# Patient Record
(demographics unavailable — no encounter records)

---

## 2025-06-27 NOTE — ASSESSMENT
[FreeTextEntry1] : 71-year-old RH male patient who presents [facility] as a new patient for initial evaluation. Patient was referred by Dr Farnsworth to our clinic for concerns of Parkinson's Disease.  ANSELMO scan (6/5/25) - mildly dec uptake in left caudate nucleus, minimally decrease update in the right putamen, markedly decreased update in the left putamen. Confirmed with PD.  Overall, Pt is stable and physically active. Discuss phenomenon and evolution of PD, at this time mild PD with no evidence of significant evolution: slight diminish of trunk movement with walking. Acknowledged and encourage walking and physically active. Discussed medication use, MRI brain, diet in PD.    Plan  - MRI Brain to establish baseline and complete work-up to rule out vascular disease that may contribute to the underlying neurological picture. - Start Rasagiline 0.5mg QAM [id number], then 1mg QAM. - Continue PT - Discussed Mediterranean diet - Encouraged to increase exercise and physical activity and maintain an active social and intellectual life - Follow up  in 1 months - Follow up with KRISSY Castillo in 3 months    More than 50% of the visit were dedicated to review of treatment, therapeutic plan, and prognosis, and patient was counseled on coping and physical and emotional wellbeing.

## 2025-06-27 NOTE — HISTORY OF PRESENT ILLNESS
[FreeTextEntry1] :  71-year-old RH male patient who presents to [facility] as a new patient for initial evaluation. Patient was referred by Dr Farnsworth to our clinic for concerns of Parkinson's Disease.   Wife notices 1year ago, gait shuffling and stooped posture at least a year ago. The patient noticed stiffness, dull pain to right hand since 2/2025, went to ortho specialist s/p x-ray of hand, mild arthritis of right hand. at the same time, notice difficulty writing and buttoning shirt.  In 4/2025, annual physical, report to PCP not able to use hand as used to. Saw Dr Land s/p EEG (-), carotid doppler (16-49% stenosis in the right ICA and 16-49% in the left ICA), blood test normal ANSELMO scan (6/5/25) - mildly dec uptake in left caudate nucleus, minimally decrease update in the right putamen, markedly decreased update in the left putamen. Start PT on hand, pt feels improved. Gait improved Pt reports no appearing changes in movement as comparing in 1 month ago.  Medical History - Right hand arthritis - Left Knee arthritis - Mincus tear on b/l knee - SCC of scalp     Surgical History - B/L knee arthroscopies (15years ago) - SCC excision of scalp (2012) - Tonsillectomy    Social History Ethnicity: Buddhism [place] Occupation:  retired, real estate business part-term Marital status: ,4 healthy children   Smoking: never ETHO use: glass of wine socially Substance use: never Toxin exposure: never   Family History Mother dx with dementia at age 87-88, passed away at age 88    Current Medications: Vitamins    Motor Function:   Ambulation/AD/Gait/Balance: balance ok.  shuffling gait and posture slight improved FOG: denies Falls: denies Tremor: denies Dyskinesia: denies  Hand Dexterity: hard to hold on heavy object with right hand; difficulty to write   Non-Motor Function:   ADLS: able to do ADLS, takes longer and more effort with buttoning, cutting food On Time: NA Off Time: NA Phonation: clear Sialorrhea: denies Dysphagia to S/L: denies Presyncope related to orthostasis BP today: denies BM: daily Urinary: nocturia, f/u with urologist Sleep is good, wake up 1-3x for nocturia, no issue falling back to sleep. Denies vivid dream.  RBD: denies Daytime Somnolence take short nap about 20min, wife notice fatigue late afternoon Mood: upbeat and position, concern and slight stress with diagnosis and health for the past month Hallucinations: denies Cognition: denies Recent Vision changes: denies Loss of sense of smell: denies   PT/OT/SLT PT hand 1/weel Exercise: walking/jogging 45min-1hr daily walk 35 in the after daily Pilates 4x a week Free weight 3x a week

## 2025-06-27 NOTE — HISTORY OF PRESENT ILLNESS
[FreeTextEntry1] :  71-year-old RH male patient who presents to [facility] as a new patient for initial evaluation. Patient was referred by Dr Farnsworth to our clinic for concerns of Parkinson's Disease.   Wife notices 1year ago, gait shuffling and stooped posture at least a year ago. The patient noticed stiffness, dull pain to right hand since 2/2025, went to ortho specialist s/p x-ray of hand, mild arthritis of right hand. at the same time, notice difficulty writing and buttoning shirt.  In 4/2025, annual physical, report to PCP not able to use hand as used to. Saw Dr Land s/p EEG (-), carotid doppler (16-49% stenosis in the right ICA and 16-49% in the left ICA), blood test normal ANSELMO scan (6/5/25) - mildly dec uptake in left caudate nucleus, minimally decrease update in the right putamen, markedly decreased update in the left putamen. Start PT on hand, pt feels improved. Gait improved Pt reports no appearing changes in movement as comparing in 1 month ago.  Medical History - Right hand arthritis - Left Knee arthritis - Mincus tear on b/l knee - SCC of scalp     Surgical History - B/L knee arthroscopies (15years ago) - SCC excision of scalp (2012) - Tonsillectomy    Social History Ethnicity: Gnosticism [place] Occupation:  retired, real estate business part-term Marital status: ,4 healthy children   Smoking: never ETHO use: glass of wine socially Substance use: never Toxin exposure: never   Family History Mother dx with dementia at age 87-88, passed away at age 88    Current Medications: Vitamins    Motor Function:   Ambulation/AD/Gait/Balance: balance ok.  shuffling gait and posture slight improved FOG: denies Falls: denies Tremor: denies Dyskinesia: denies  Hand Dexterity: hard to hold on heavy object with right hand; difficulty to write   Non-Motor Function:   ADLS: able to do ADLS, takes longer and more effort with buttoning, cutting food On Time: NA Off Time: NA Phonation: clear Sialorrhea: denies Dysphagia to S/L: denies Presyncope related to orthostasis BP today: denies BM: daily Urinary: nocturia, f/u with urologist Sleep is good, wake up 1-3x for nocturia, no issue falling back to sleep. Denies vivid dream.  RBD: denies Daytime Somnolence take short nap about 20min, wife notice fatigue late afternoon Mood: upbeat and position, concern and slight stress with diagnosis and health for the past month Hallucinations: denies Cognition: denies Recent Vision changes: denies Loss of sense of smell: denies   PT/OT/SLT PT hand 1/weel Exercise: walking/jogging 45min-1hr daily walk 35 in the after daily Pilates 4x a week Free weight 3x a week

## 2025-06-27 NOTE — HISTORY OF PRESENT ILLNESS
[FreeTextEntry1] :  71-year-old RH male patient who presents to [facility] as a new patient for initial evaluation. Patient was referred by Dr Farnsworth to our clinic for concerns of Parkinson's Disease.   Wife notices 1year ago, gait shuffling and stooped posture at least a year ago. The patient noticed stiffness, dull pain to right hand since 2/2025, went to ortho specialist s/p x-ray of hand, mild arthritis of right hand. at the same time, notice difficulty writing and buttoning shirt.  In 4/2025, annual physical, report to PCP not able to use hand as used to. Saw Dr Land s/p EEG (-), carotid doppler (16-49% stenosis in the right ICA and 16-49% in the left ICA), blood test normal ANSELMO scan (6/5/25) - mildly dec uptake in left caudate nucleus, minimally decrease update in the right putamen, markedly decreased update in the left putamen. Start PT on hand, pt feels improved. Gait improved Pt reports no appearing changes in movement as comparing in 1 month ago.  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  shuffling gait and posture slight improved FOG: denies Falls: denies Tremor: denies Dyskinesia: denies  Hand Dexterity: hard to hold on heavy object with right hand; difficulty to write   Non-Motor Function:   ADLS: able to do ADLS, takes longer and more effort with buttoning, cutting food On Time: NA Off Time: NA Phonation: clear Sialorrhea: denies Dysphagia to S/L: denies Presyncope related to orthostasis BP today: denies BM: daily Urinary: nocturia, f/u with urologist Sleep is good, wake up 1-3x for nocturia, no issue falling back to sleep. Denies vivid dream.  RBD: denies Daytime Somnolence take short nap about 20min, wife notice fatigue late afternoon Mood: upbeat and position, concern and slight stress with diagnosis and health for the past month Hallucinations: denies Cognition: denies Recent Vision changes: denies Loss of sense of smell: denies   PT/OT/SLT PT hand 1/weel Exercise: walking/jogging 45min-1hr daily walk 35 in the after daily Pilates 4x a week Free weight 3x a week

## 2025-06-27 NOTE — ASSESSMENT
[FreeTextEntry1] : 71-year-old RH male patient who presents [facility] as a new patient for initial evaluation. Patient was referred by Dr Farnsworth to our clinic for concerns of Parkinson's Disease.  ANSELMO scan (6/5/25) - mildly dec uptake in left caudate nucleus, minimally decrease update in the right putamen, markedly decreased update in the left putamen. Confirmed with PD.  Overall, Pt is stable and physically active. Discuss phenomenon and evolution of PD, at this time mild PD with no evidence of significant evolution: slight diminish of trunk movement with walking. Acknowledged and encourage walking and physically active. Discussed medication use, MRI brain, diet in PD.    Plan  - MRI Brain to establish baseline and complete work-up to rule out vascular disease that may contribute to the underlying neurological picture. - Start Rasagiline 0.5mg QAM [id number], then 1mg QAM. - Continue PT - Discussed Mediterranean diet - Encouraged to increase exercise and physical activity and maintain an active social and intellectual life - Follow up  in 1 months - Follow up with KRISSY Castillo in 3 months    More than 50% of the visit were dedicated to review of treatment, therapeutic plan, and prognosis, and patient was counseled on coping and physical and emotional wellbeing.

## 2025-06-27 NOTE — ASSESSMENT
[FreeTextEntry1] : 71-year-old RH male patient who presents [facility] as a new patient for initial evaluation. Patient was referred by Dr Farnsworth to our clinic for concerns of Parkinson's Disease.  ANSELMO scan (6/5/25) - mildly dec uptake in left caudate nucleus, minimally decrease update in the right putamen, markedly decreased update in the left putamen. Confirmed with PD.  Overall, Pt is stable and physically active. Discuss phenomenon and evolution of PD, at this time mild PD with no evidence of significant evolution: slight diminish of trunk movement with walking. Acknowledged and encourage walking and physically active. Discussed medication use, MRI brain, diet in PD.    Plan  - MRI Brain to establish baseline and complete work-up to rule out vascular disease that may contribute to the underlying neurological picture. - Start Rasagiline 0.5mg QAM [id number], then 1mg QAM. - Continue PT - Discussed Mediterranean diet - Encouraged to increase exercise and physical activity and maintain an active social and intellectual life - Follow up  in 1 months - Follow up with KRISSY Castillo in 3 months    More than 50% of the visit were dedicated to review of treatment, therapeutic plan, and prognosis, and patient was counseled on coping and physical and emotional wellbeing.

## 2025-06-27 NOTE — PHYSICAL EXAM
[FreeTextEntry1] : Neurologic:   Orientation: normal cognitive Memory: short term memory intact, remote memory intact and recent registration memory intact.   Attention: the attention span was normal, normal concentrating ability and visual attention was not decreased.  His speech is fluent without and dysphasia or dysarthria. Cranial Nerves: visual acuity intact bilaterally, visual fields full to confrontation, pupils equal round and reactive to light, extraocular motion intact, facial sensation intact symmetrically, face symmetrical, hearing was intact bilaterally, head turning and shoulder shrug symmetric and there was no tongue deviation with protrusion.   Sensory exam: light touch was intact and pain and temperature was intact.   Motor Strength: 5/5 Mild rigidity with some cogwheeling, present bilaterally, more evident on the right side. No appearing tremor noted Mild loss of motor dexterity, with decrement at rapid sequential and rapid alternating movements, also slightly more evident on the right side.  Finger to nose dysmetria was not present.   Foot tapping is slightly impaired, also with the right side more affected. Posture is mildly stooped, with some shortening of gait stride and mild asymmetry with left reduction, and decrease arm swing, more evident on the left side. Tends to lean on the right side. No trouble standing from a seated position with arms crossed. No trouble with turns, no freezing of gait. Postural reflexes are normal Deep tendon reflexes: Biceps right 1+. Biceps left 1+.  Triceps right 1+. Triceps left 1+.  Brachioradialis right 1+. Brachioradialis left 1+.  Patella right1+. Patella left 1+.

## 2025-07-17 NOTE — PHYSICAL EXAM
[FreeTextEntry1] : Patient sitting comfortably in the chair. Oriented to time, place, situation and self.  Language comprehension and production normal, fluent speech, mild aprosody.  Pupils symmetric and reactive. EOMI.  Face/shoulder shrug symmetric.  Mild hypomimia with reduced blinking. Tongue/palate midline. No jaw/head/tongue tremor.  Strength normal throughout.  Sensation to light touch intact throughout.  No tremor at rest, no postural/kinetic tremor. Mild cogwheeling rigidity R > L.  Mild bradykinesia of [place] of upper L extremity and RLE, moderate bradykinesia at RUE.   DTRs 1+ and symmetric.  No dysmetria on finger-to-nose.  Raises from chair without using hands. Negative Romberg.  Walks with good stride and speed, good amplitude, reduced arm swings on the R, mild stooping.

## 2025-07-17 NOTE — HISTORY OF PRESENT ILLNESS
[FreeTextEntry1] : He is here accompanied by his wife after seeing Dr. Galeano for Parkinson's disease. He was referred by Dr. Mae.    He was recently diagnosed with Parkinson's disease . His wife has noticed for a couple years that he was slouching and shuffling. In February 2025 he started noticing he would not move the R hand as well especially for tasks that require fine dexterity, more difficulties writing, buttoning, and some soreness in the R wrist and hand. He initially saw the orthopedist and was diagnosed with mild arthritis which he was told would not explain his symptoms.  He was eventually recommended to see a neurologist in April by his PCP. He saw Dr. Blanco and did some work up including positive ANSELMO SCAN L > R. He was diagnosed with PD.  Since being diagnosed he started OT for the R hand which he finds very beneficial. He also does Pilates weekly, jogs 45 min every morning, free weights multiple times a week and feels it helps.  He is also being more conscious about his posture and he feels that now the stooping mostly happens at night when fatigued.  More recently he got brain MRI, unremarkable. EEG and doppler US of extra and intracranial vessels also unremarkable.  He does not feel that his balance is off but is being more cautious since learning of his diagnosis. He cannot dribble anymore with the R hand playing basketball, but he can shoot.  He has troubles holding a thin glass of wine with a stem with the R hand.  He does not feel his speech is affected.  He feels stressed and frustrated by the reduced dexterity, and he feels aggravated by this by the end of the day and more fatigued and short tempered.  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 No troubles performing his job.  4-5 months ago started feeling that his left eye is very dry at night.  No anosmia.  Sleeps well, he is disturbed by nocturia, 2-3 times per night. He goes right back to sleep. He feels rested in the morning when waking up.  No dream enactment, no vivid dreams.  No issues with swallowing.  No hallucinations/disperceptions.  No constipation.  No memory issues.   PMH of meniscectomies/arthroscopies (2 on the L and 1 on the R), tonsillectomy as a child.  He is not on any medications, he was recommended rasagiline and has not tried it yet. Ancestry is eastern  [place]astaf[place], no family history of PD, family history of dementia (mother).

## 2025-07-17 NOTE — HISTORY OF PRESENT ILLNESS
[FreeTextEntry1] : He is here accompanied by his wife after seeing Dr. Galeano for Parkinson's disease. He was referred by Dr. Mae.    He was recently diagnosed with Parkinson's disease . His wife has noticed for a couple years that he was slouching and shuffling. In February 2025 he started noticing he would not move the R hand as well especially for tasks that require fine dexterity, more difficulties writing, buttoning, and some soreness in the R wrist and hand. He initially saw the orthopedist and was diagnosed with mild arthritis which he was told would not explain his symptoms.  He was eventually recommended to see a neurologist in April by his PCP. He saw Dr. Blanco and did some work up including positive ANSELMO SCAN L > R. He was diagnosed with PD.  Since being diagnosed he started OT for the R hand which he finds very beneficial. He also does Pilates weekly, jogs 45 min every morning, free weights multiple times a week and feels it helps.  He is also being more conscious about his posture and he feels that now the stooping mostly happens at night when fatigued.  More recently he got brain MRI, unremarkable. EEG and doppler US of extra and intracranial vessels also unremarkable.  He does not feel that his balance is off but is being more cautious since learning of his diagnosis. He cannot dribble anymore with the R hand playing basketball, but he can shoot.  He has troubles holding a thin glass of wine with a stem with the R hand.  He does not feel his speech is affected.  He feels stressed and frustrated by the reduced dexterity, and he feels aggravated by this by the end of the day and more fatigued and short tempered.  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 No troubles performing his job.  4-5 months ago started feeling that his left eye is very dry at night.  No anosmia.  Sleeps well, he is disturbed by nocturia, 2-3 times per night. He goes right back to sleep. He feels rested in the morning when waking up.  No dream enactment, no vivid dreams.  No issues with swallowing.  No hallucinations/disperceptions.  No constipation.  No memory issues.   PMH of meniscectomies/arthroscopies (2 on the L and 1 on the R), tonsillectomy as a child.  He is not on any medications, he was recommended rasagiline and has not tried it yet. Ancestry is eastern  [place], no family history of PD, family history of dementia (mother).

## 2025-07-17 NOTE — DISCUSSION/SUMMARY
[FreeTextEntry1] : 70 yo with about 1 year history of mild shuffling noticed by family, and about 6-month history of difficulties using the R dominant hand (slowness, incoordination with fine motor tasks, dull pain), improved on OT. Recently diagnosed with PD after positive ANSELMO scan, already saw Dr. Blanco and Dr. Galeano. Was recommended starting rasagiline but would like to defer medications for now.  He is very active, still working part time, exercising vigorously and religiously.  He feels that OT and Pilates have been helpful for both hand mobility and posture.  He admits to feeling significantly aggravated especially at the end of the day from the difficulties with using his dominant hand. 0 Discussed diagnosis, impossibility to estimate speed of progression especially having seen him only once, reassured him that he is taking all measures to ensure lowest rate of complications (exercise, mediterranean diet).   Discussed as well that given symptom burden and topography (dominant hand) it would be perfectly reasonable for him to start medications at this point if he is interested, either with rasagiline (knowing that the benefit could be mild) or with low dose levodopa. He prefers to defer medications for now.  He inquired into trials with disease-modifying potential. Discussed that the most interesting ones are relative to genetic forms of PD, advised to look into PD-gene study with the [facility].   Plan: - continue OT - continue regular exercise - RTC in 4 months, call earlier if issues